# Patient Record
(demographics unavailable — no encounter records)

---

## 2025-01-15 NOTE — HISTORY OF PRESENT ILLNESS
[FreeTextEntry1] : 53-year-old male with history of hypertension, low HDL, anxiety, and transient atrial fibrillation presents for yearly CPE. In the past year he denies hospitalization, surgery, new medical diagnoses [de-identified] : States had brief episode (under 10 hours) of symptomatic A-fib about 1 month ago.  Previous episode was over 1 year prior to that which is better (less frequent) than recurrences had been in the past.  Severity/duration of most recent episode also was reduced from prior episodes. Compliant with all medications including citalopram 20 mg, atenolol 25 mg (with an extra dose during the episode), lisinopril 5 mg, and ASA 81 mg.  Has not seen cardiologist in over a year.

## 2025-01-15 NOTE — PHYSICAL EXAM
[No Acute Distress] : no acute distress [Well Nourished] : well nourished [Well Developed] : well developed [Well-Appearing] : well-appearing [Normal Sclera/Conjunctiva] : normal sclera/conjunctiva [PERRL] : pupils equal round and reactive to light [EOMI] : extraocular movements intact [Normal Outer Ear/Nose] : the outer ears and nose were normal in appearance [Normal Oropharynx] : the oropharynx was normal [No JVD] : no jugular venous distention [No Lymphadenopathy] : no lymphadenopathy [Supple] : supple [Thyroid Normal, No Nodules] : the thyroid was normal and there were no nodules present [No Respiratory Distress] : no respiratory distress  [No Accessory Muscle Use] : no accessory muscle use [Clear to Auscultation] : lungs were clear to auscultation bilaterally [Normal Rate] : normal rate  [Regular Rhythm] : with a regular rhythm [Normal S1, S2] : normal S1 and S2 [No Murmur] : no murmur heard [No Carotid Bruits] : no carotid bruits [No Edema] : there was no peripheral edema [Soft] : abdomen soft [Non Tender] : non-tender [Non-distended] : non-distended [No Masses] : no abdominal mass palpated [No HSM] : no HSM [Normal Bowel Sounds] : normal bowel sounds [No CVA Tenderness] : no CVA  tenderness [No Spinal Tenderness] : no spinal tenderness [No Joint Swelling] : no joint swelling [Grossly Normal Strength/Tone] : grossly normal strength/tone [No Rash] : no rash [Coordination Grossly Intact] : coordination grossly intact [No Focal Deficits] : no focal deficits [Normal Gait] : normal gait [Deep Tendon Reflexes (DTR)] : deep tendon reflexes were 2+ and symmetric [Normal Affect] : the affect was normal [Normal Insight/Judgement] : insight and judgment were intact

## 2025-01-15 NOTE — ASSESSMENT
[FreeTextEntry1] : Health Maintenance Daily aerobic exercises strongly recommended. No STD risk or substance abuse per patient report. Occasional gender specific self-examination is suggested. No depression. Competent with ADLs. Serial colonoscopy scheduled for next month. Completed primary COVID-vaccine series with monovalent and polyvalent boosters.  Declines variant specific COVID-vaccine now available. Patient willing to have shingles vaccine starting in a year or 2 from now. Quadrivalent flu vaccine administered left arm today.  Obesity BMI elevated at 30 indicating borderline obesity.  A 25 pound weight loss is recommended. Obesity-associated mortality/morbidity discussed including increased risk for T2DM, HTN, CAD, as well as risk for fatty liver and lower extremity arthritis. Discussed lifestyle management including daily aerobic exercise and structured diet low in saturated fats and trans fats.  Specific recommendations provided.  History of paroxysmal atrial fibrillation Continuing on treatment with atenolol and ASA. Condition has been nonprogressive for over 20 years. Strongly recommend follow-up with his cardiologist at [facility] in 2024.

## 2025-01-15 NOTE — COUNSELING
[Weight management counseling provided] : Weight management [Healthy eating counseling provided] : healthy eating [Activity counseling provided] : activity [Potential consequences of obesity discussed] : Potential consequences of obesity discussed [Benefits of weight loss discussed] : Benefits of weight loss discussed [Structured Weight Management Program suggested:] : Structured weight management program suggested [Encouraged to maintain food diary] : Encouraged to maintain food diary [Encouraged to increase physical activity] : Encouraged to increase physical activity [Encouraged to use exercise tracking device] : Encouraged to use exercise tracking device [Target Wt Loss Goal ___] : Weight Loss Goals: Target weight loss goal [unfilled] lbs [Weigh Self Weekly] : weigh self weekly [Decrease Portions] : decrease portions [Keep Food Diary] : keep food diary [Good understanding] : Patient has a good understanding of disease, goals and obesity follow-up plan [FreeTextEntry4] : 15

## 2025-01-15 NOTE — HEALTH RISK ASSESSMENT
[Good] : ~his/her~  mood as  good [No] : In the past 12 months have you used drugs other than those required for medical reasons? No [No falls in past year] : Patient reported no falls in the past year [0] : 2) Feeling down, depressed, or hopeless: Not at all (0) [PHQ-2 Negative - No further assessment needed] : PHQ-2 Negative - No further assessment needed [Audit-CScore] : 0 [[id number]] : 0 [Never] : Never [Patient reported colonoscopy was abnormal] : Patient reported colonoscopy was abnormal [HIV test declined] : HIV test declined [Hepatitis C test declined] : Hepatitis C test declined [Change in mental status noted] : No change in mental status noted [With Family] : lives with family [Employed] : employed [] :  [# Of Children ___] : has [unfilled] children [Sexually Active] : sexually active [High Risk Behavior] : no high risk behavior [Fully functional (bathing, dressing, toileting, transferring, walking, feeding)] : Fully functional (bathing, dressing, toileting, transferring, walking, feeding) [Fully functional (using the telephone, shopping, preparing meals, housekeeping, doing laundry, using] : Fully functional and needs no help or supervision to perform IADLs (using the telephone, shopping, preparing meals, housekeeping, doing laundry, using transportation, managing medications and managing finances) [Reports changes in hearing] : Reports no changes in hearing [Reports changes in vision] : Reports no changes in vision [Reports normal functional visual acuity (ie: able to read med bottle)] : Reports normal functional visual acuity [Reports changes in dental health] : Reports no changes in dental health [Seat Belt] :  uses seat belt [Sunscreen] : uses sunscreen [TB Exposure] : is not being exposed to tuberculosis [ColonoscopyDate] : 03/2022 [ColonoscopyComments] : 4 polyps. Follow up 1/2024 [Patient/Caregiver not ready to engage] : , patient/caregiver not ready to engage [AdvancecareDate] : 12/2023

## 2025-03-19 NOTE — ASSESSMENT
[FreeTextEntry1] : Health Maintenance Daily aerobic exercises strongly recommended. No STD risk or substance abuse per patient report. Occasional gender specific self-examination is suggested. No depression. Competent with ADLs. Serial colonoscopy this year due to poor preparation on colonoscopy from 2 years ago.  Referral/contact information provided.  Patient states he will make an appointment soon.  Ghassan Yañez Completed primary COVID-vaccine series with monovalent and polyvalent boosters.  Declines variant specific COVID-vaccine now available. Patient willing to have shingles vaccine starting next year Did not receive flu vaccine for this year..  Overweight status Current BMI is 28.3.  At least a 20 pound weight loss is recommended. Overweight-associated mortality/morbidity discussed including increased risk for T2DM, HTN, CAD, as well as risk for fatty liver and lower extremity arthritis. Discussed lifestyle management including daily aerobic exercise and structured diet low in saturated fats and trans fats.  Specific recommendations provided.  History of paroxysmal atrial fibrillation Continuing on treatment with atenolol and ASA. Condition has been nonprogressive for over 20 years. Strongly recommend ongoing follow-up with his cardiologist at [facility]  Anxiety Well compensated on current dose of citalopram 20 mg.

## 2025-03-19 NOTE — HEALTH RISK ASSESSMENT
[NO] : No [Audit-CScore] : 0 [[id number]] : 0 [Change in mental status noted] : No change in mental status noted [High Risk Behavior] : no high risk behavior [Reports changes in hearing] : Reports no changes in hearing [Reports changes in vision] : Reports no changes in vision [Reports changes in dental health] : Reports no changes in dental health [TB Exposure] : is not being exposed to tuberculosis [ColonoscopyDate] : 03/2022 [ColonoscopyComments] : 4 polyps. Follow up 1/2024 [AdvancecareDate] : 03/2025

## 2025-03-19 NOTE — HISTORY OF PRESENT ILLNESS
[FreeTextEntry1] : 53-year-old male with history of hypertension, low HDL, anxiety, and transient atrial fibrillation presents for yearly CPE. In the past year he denies hospitalization, surgery, new medical diagnoses [de-identified] : States had brief episode (under 10 hours) of symptomatic A-fib about 1 month ago.  Previous episode was over 1 year prior to that which is better (less frequent) than recurrences had been in the past.  Severity/duration of most recent episode also was reduced from prior episodes. Compliant with all medications including citalopram 20 mg, atenolol 25 mg (with an extra dose during the episode), lisinopril 5 mg, and ASA 81 mg.  Has not seen cardiologist in over a year.

## 2025-03-19 NOTE — HEALTH RISK ASSESSMENT
[NO] : No [Audit-CScore] : 0 [[id number]] : 0 [Change in mental status noted] : No change in mental status noted [High Risk Behavior] : no high risk behavior [Reports changes in hearing] : Reports no changes in hearing [Reports changes in vision] : Reports no changes in vision [Reports changes in dental health] : Reports no changes in dental health [TB Exposure] : is not being exposed to tuberculosis [ColonoscopyDate] : 03/2022 [ColonoscopyComments] : 4 polyps. Follow up 1/2024 [AdvancecareDate] : 03/2025

## 2025-03-19 NOTE — HISTORY OF PRESENT ILLNESS
[FreeTextEntry1] : 53-year-old male with history of hypertension, low HDL, anxiety, and transient atrial fibrillation presents for yearly CPE. In the past year he denies hospitalization, surgery, new medical diagnoses [de-identified] : States had brief episode (under 10 hours) of symptomatic A-fib about 1 month ago.  Previous episode was over 1 year prior to that which is better (less frequent) than recurrences had been in the past.  Severity/duration of most recent episode also was reduced from prior episodes. Compliant with all medications including citalopram 20 mg, atenolol 25 mg (with an extra dose during the episode), lisinopril 5 mg, and ASA 81 mg.  Has not seen cardiologist in over a year.

## 2025-03-19 NOTE — ASSESSMENT
[FreeTextEntry1] : Health Maintenance Daily aerobic exercises strongly recommended. No STD risk or substance abuse per patient report. Occasional gender specific self-examination is suggested. No depression. Competent with ADLs. Serial colonoscopy this year due to poor preparation on colonoscopy from 2 years ago.  Referral/contact information provided.  Patient states he will make an appointment soon.  Ghassan Yañez Completed primary COVID-vaccine series with monovalent and polyvalent boosters.  Declines variant specific COVID-vaccine now available. Patient willing to have shingles vaccine starting next year Did not receive flu vaccine for this year..  Overweight status Current BMI is 28.3.  At least a 20 pound weight loss is recommended. Overweight-associated mortality/morbidity discussed including increased risk for T2DM, HTN, CAD, as well as risk for fatty liver and lower extremity arthritis. Discussed lifestyle management including daily aerobic exercise and structured diet low in saturated fats and trans fats.  Specific recommendations provided.  History of paroxysmal atrial fibrillation Continuing on treatment with atenolol and ASA. Condition has been nonprogressive for over 20 years. Strongly recommend ongoing follow-up with his cardiologist at [facility]  Anxiety Well compensated on current dose of citalopram 20 mg.